# Patient Record
Sex: MALE | HISPANIC OR LATINO | Employment: FULL TIME | URBAN - METROPOLITAN AREA
[De-identification: names, ages, dates, MRNs, and addresses within clinical notes are randomized per-mention and may not be internally consistent; named-entity substitution may affect disease eponyms.]

---

## 2020-12-03 ENCOUNTER — OFFICE VISIT (OUTPATIENT)
Dept: CARDIOLOGY | Facility: MEDICAL CENTER | Age: 20
End: 2020-12-03
Payer: COMMERCIAL

## 2020-12-03 VITALS
HEIGHT: 71 IN | HEART RATE: 74 BPM | SYSTOLIC BLOOD PRESSURE: 122 MMHG | RESPIRATION RATE: 16 BRPM | DIASTOLIC BLOOD PRESSURE: 76 MMHG | OXYGEN SATURATION: 98 % | BODY MASS INDEX: 35.56 KG/M2 | WEIGHT: 254 LBS

## 2020-12-03 DIAGNOSIS — R07.9 CHEST PAIN, UNSPECIFIED TYPE: ICD-10-CM

## 2020-12-03 LAB — EKG IMPRESSION: NORMAL

## 2020-12-03 PROCEDURE — 99204 OFFICE O/P NEW MOD 45 MIN: CPT | Performed by: INTERNAL MEDICINE

## 2020-12-03 PROCEDURE — 93000 ELECTROCARDIOGRAM COMPLETE: CPT | Performed by: INTERNAL MEDICINE

## 2020-12-03 ASSESSMENT — ENCOUNTER SYMPTOMS
CLAUDICATION: 0
IRREGULAR HEARTBEAT: 0
SYNCOPE: 0
DECREASED APPETITE: 0
ABDOMINAL PAIN: 0
CONSTIPATION: 0
DEPRESSION: 0
COUGH: 0
NAUSEA: 0
BLURRED VISION: 0
ALTERED MENTAL STATUS: 0
NEAR-SYNCOPE: 0
FLANK PAIN: 0
HEARTBURN: 0
PALPITATIONS: 0
DIZZINESS: 0
DYSPNEA ON EXERTION: 0
VOMITING: 0
PND: 0
FEVER: 0
SHORTNESS OF BREATH: 0
BACK PAIN: 0
DIARRHEA: 0
WEIGHT LOSS: 0
ORTHOPNEA: 0
WEIGHT GAIN: 0

## 2020-12-03 NOTE — PROGRESS NOTES
Cardiology Note    Chief Complaint   Patient presents with   • Chest Pain       History of Present Illness: Oliver Lucas is a 20 y.o. male who presents for chest pain.     Describes left sided chest discomfort associated with back pain leading to neck pain and headaches. Aggravated with deep breathing and turning in bed to left side. Sometimes can reproduce pain, however, this is not consistent and he sometimes feels like pain is deeper in. Stays physically active at work, however, has been limited due to knee pain while playing soccer. Now can walk about upwards of two miles. Would have to stop due to knee of hip pain. Had echo performed where was told a part of his heart was swollen. He does drink alcohol about 4-5 daily and binges on weekends with family gatherings. Denies tobacco. When eats spicy notices heartburn. Claims this has not been the case lately.     Review of Systems   Constitution: Negative for decreased appetite, fever, malaise/fatigue, weight gain and weight loss.   HENT: Negative for congestion and nosebleeds.    Eyes: Negative for blurred vision.   Cardiovascular: Positive for chest pain. Negative for claudication, dyspnea on exertion, irregular heartbeat, leg swelling, near-syncope, orthopnea, palpitations, paroxysmal nocturnal dyspnea and syncope.   Respiratory: Negative for cough and shortness of breath.    Endocrine: Negative for cold intolerance and heat intolerance.   Skin: Negative for rash.   Musculoskeletal: Negative for back pain.   Gastrointestinal: Negative for abdominal pain, constipation, diarrhea, heartburn, melena, nausea and vomiting.   Genitourinary: Negative for dysuria, flank pain and hematuria.   Neurological: Negative for dizziness.   Psychiatric/Behavioral: Negative for altered mental status and depression.         Past Medical History:   Diagnosis Date   • Hypercholesteremia    • Hypertension          History reviewed. No pertinent surgical history.      Current  Outpatient Medications   Medication Sig Dispense Refill   • ibuprofen (MOTRIN) 200 MG Tab Take 400 mg by mouth every 6 hours as needed.     • cyclobenzaprine (FLEXERIL) 10 MG Tab Take 1 Tab by mouth 3 times a day as needed. (Patient not taking: Reported on 12/3/2020) 20 Tab 0   • ibuprofen (MOTRIN) 800 MG Tab Take 1 Tab by mouth every 8 hours as needed. (Patient not taking: Reported on 12/3/2020) 15 Tab 3   • acetaminophen (TYLENOL) 160 MG/5ML SUSP Take  by mouth every four hours as needed.       No current facility-administered medications for this visit.          No Known Allergies      History reviewed. No pertinent family history.      Social History     Socioeconomic History   • Marital status: Single     Spouse name: Not on file   • Number of children: Not on file   • Years of education: Not on file   • Highest education level: Not on file   Occupational History   • Not on file   Social Needs   • Financial resource strain: Not on file   • Food insecurity     Worry: Not on file     Inability: Not on file   • Transportation needs     Medical: Not on file     Non-medical: Not on file   Tobacco Use   • Smoking status: Never Smoker   • Smokeless tobacco: Never Used   Substance and Sexual Activity   • Alcohol use: Not on file   • Drug use: Not on file   • Sexual activity: Not on file   Lifestyle   • Physical activity     Days per week: Not on file     Minutes per session: Not on file   • Stress: Not on file   Relationships   • Social connections     Talks on phone: Not on file     Gets together: Not on file     Attends [employer] service: Not on file     Active member of club or organization: Not on file     Attends meetings of clubs or organizations: Not on file     Relationship status: Not on file   • Intimate partner violence     Fear of current or ex partner: Not on file     Emotionally abused: Not on file     Physically abused: Not on file     Forced sexual activity: Not on file   Other Topics Concern   • Not on  "file   Social History Narrative   • Not on file         Physical Exam:  Ambulatory Vitals  /76 (BP Location: Right arm, Patient Position: Sitting, BP Cuff Size: Adult)   Pulse 74   Resp 16   Ht 1.803 m (5' 11\")   Wt 115.2 kg (254 lb)   SpO2 98%    BP Readings from Last 4 Encounters:   12/03/20 122/76   09/29/15 125/58 (72 %, Z = 0.60 /  13 %, Z = -1.11)*   09/09/11 139/87 (>99 %, Z >2.33 /  >99 %, Z >2.33)*     *BP percentiles are based on the 2017 AAP Clinical Practice Guideline for boys     Weight/BMI:   Vitals:    12/03/20 1436   BP: 122/76   Weight: 115.2 kg (254 lb)   Height: 1.803 m (5' 11\")    Body mass index is 35.43 kg/m².  Wt Readings from Last 4 Encounters:   12/03/20 115.2 kg (254 lb)   09/29/15 104.8 kg (231 lb) (99 %, Z= 2.26)*   09/09/11 74 kg (163 lb 2.3 oz) (97 %, Z= 1.96)*     * Growth percentiles are based on CDC (Boys, 2-20 Years) data.       Physical Exam   Constitutional: He is oriented to person, place, and time and well-developed, well-nourished, and in no distress. No distress.   HENT:   Head: Normocephalic and atraumatic.   Eyes: Pupils are equal, round, and reactive to light. Conjunctivae are normal.   Neck: Normal range of motion. Neck supple. No JVD present.   Cardiovascular: Normal rate, regular rhythm, normal heart sounds and intact distal pulses. Exam reveals no gallop and no friction rub.   No murmur heard.  Pulmonary/Chest: Effort normal and breath sounds normal. No respiratory distress. He has no wheezes. He has no rales. He exhibits no tenderness.   Abdominal: Soft. Bowel sounds are normal. He exhibits no distension.   Musculoskeletal:         General: No edema.   Neurological: He is alert and oriented to person, place, and time.   Skin: Skin is warm and dry.   Psychiatric: Affect and judgment normal.       Lab Data Review:  Lab Results   Component Value Date/Time    CHOLSTRLTOT 192 (H) 09/09/2011 02:07 PM     09/09/2011 02:07 PM    HDL 34 (L) 09/09/2011 02:07 PM "    TRIGLYCERIDE 122 09/09/2011 02:07 PM       Lab Results   Component Value Date/Time    SODIUM 136 09/09/2011 02:07 PM    POTASSIUM 4.2 09/09/2011 02:07 PM    CHLORIDE 101 09/09/2011 02:07 PM    CO2 23 09/09/2011 02:07 PM    GLUCOSE 91 09/09/2011 02:07 PM    BUN 10 09/09/2011 02:07 PM    CREATININE 0.47 (L) 09/09/2011 02:07 PM     CrCl cannot be calculated (Patient's most recent lab result is older than the maximum 7 days allowed.).  Lab Results   Component Value Date/Time    ALKPHOSPHAT 254 09/09/2011 02:07 PM    ASTSGOT 87 (H) 09/09/2011 02:07 PM    ALTSGPT 107 (H) 09/09/2011 02:07 PM    TBILIRUBIN 0.4 09/09/2011 02:07 PM      Lab Results   Component Value Date/Time    WBC 7.4 09/09/2011 02:07 PM     No results found for: HBA1C  No components found for: TROP      Cardiac Imaging and Procedures Review:      EKG 12/3/20 sinus 74 with incomplete rbbb    Medical Decision Making:  Problem List Items Addressed This Visit     Chest pain    Relevant Orders    EKG (Completed)    EC-ECHOCARDIOGRAM COMPLETE W/O CONT    EC-ECHOCARDIOGRAM DOBUTAMINE REST/STRESS W/ CONT        Most likely chest wall pain and musculoskeletal complaints. He does persist sometimes changes character and feels deeper ache. Also had supposed abnormal outside echo. Will repeat echo study and add dobutamine stress; cannot exercise due to knee injury. If normal, no further testing. Tylenol for pain. Recommend rest and regular calisthenics with stretching when pain improves.     It was my pleasure to meet with Mr. Leeon Lance.

## 2020-12-03 NOTE — PATIENT INSTRUCTIONS
Gastroesophageal Reflux Disease, Adult  Gastroesophageal reflux (EDD) happens when acid from the stomach flows up into the tube that connects the mouth and the stomach (esophagus). Normally, food travels down the esophagus and stays in the stomach to be digested. With EDD, food and stomach acid sometimes move back up into the esophagus. You may have a disease called gastroesophageal reflux disease (GERD) if the reflux:  · Happens often.  · Causes frequent or very bad symptoms.  · Causes problems such as damage to the esophagus.  When this happens, the esophagus becomes sore and swollen (inflamed). Over time, GERD can make small holes (ulcers) in the lining of the esophagus.  What are the causes?  This condition is caused by a problem with the muscle between the esophagus and the stomach. When this muscle is weak or not normal, it does not close properly to keep food and acid from coming back up from the stomach. The muscle can be weak because of:  · Tobacco use.  · Pregnancy.  · Having a certain type of hernia (hiatal hernia).  · Alcohol use.  · Certain foods and drinks, such as coffee, chocolate, onions, and peppermint.  What increases the risk?  You are more likely to develop this condition if you:  · Are overweight.  · Have a disease that affects your connective tissue.  · Use NSAID medicines.  What are the signs or symptoms?  Symptoms of this condition include:  · Heartburn.  · Difficult or painful swallowing.  · The feeling of having a lump in the throat.  · A bitter taste in the mouth.  · Bad breath.  · Having a lot of saliva.  · Having an upset or bloated stomach.  · Belching.  · Chest pain. Different conditions can cause chest pain. Make sure you see your doctor if you have chest pain.  · Shortness of breath or noisy breathing (wheezing).  · Ongoing (chronic) cough or a cough at night.  · Wearing away of the surface of teeth (tooth enamel).  · Weight loss.  How is this treated?  Treatment will depend on how  bad your symptoms are. Your doctor may suggest:  · Changes to your diet.  · Medicine.  · Surgery.  Follow these instructions at home:  Eating and drinking    · Follow a diet as told by your doctor. You may need to avoid foods and drinks such as:  ? Coffee and tea (with or without caffeine).  ? Drinks that contain alcohol.  ? Energy drinks and sports drinks.  ? Bubbly (carbonated) drinks or sodas.  ? Chocolate and cocoa.  ? Peppermint and mint flavorings.  ? Garlic and onions.  ? Horseradish.  ? Spicy and acidic foods. These include peppers, chili powder, patterson powder, vinegar, hot sauces, and BBQ sauce.  ? Citrus fruit juices and citrus fruits, such as oranges, mary, and limes.  ? Tomato-based foods. These include red sauce, chili, salsa, and pizza with red sauce.  ? Fried and fatty foods. These include donuts, french fries, potato chips, and high-fat dressings.  ? High-fat meats. These include hot dogs, rib eye steak, sausage, ham, and kelley.  ? High-fat dairy items, such as whole milk, butter, and cream cheese.  · Eat small meals often. Avoid eating large meals.  · Avoid drinking large amounts of liquid with your meals.  · Avoid eating meals during the 2-3 hours before bedtime.  · Avoid lying down right after you eat.  · Do not exercise right after you eat.  Lifestyle    · Do not use any products that contain nicotine or tobacco. These include cigarettes, e-cigarettes, and chewing tobacco. If you need help quitting, ask your doctor.  · Try to lower your stress. If you need help doing this, ask your doctor.  · If you are overweight, lose an amount of weight that is healthy for you. Ask your doctor about a safe weight loss goal.  General instructions  · Pay attention to any changes in your symptoms.  · Take over-the-counter and prescription medicines only as told by your doctor. Do not take aspirin, ibuprofen, or other NSAIDs unless your doctor says it is okay.  · Wear loose clothes. Do not wear anything tight  around your waist.  · Raise (elevate) the head of your bed about 6 inches (15 cm).  · Avoid bending over if this makes your symptoms worse.  · Keep all follow-up visits as told by your doctor. This is important.  Contact a doctor if:  · You have new symptoms.  · You lose weight and you do not know why.  · You have trouble swallowing or it hurts to swallow.  · You have wheezing or a cough that keeps happening.  · Your symptoms do not get better with treatment.  · You have a hoarse voice.  Get help right away if:  · You have pain in your arms, neck, jaw, teeth, or back.  · You feel sweaty, dizzy, or light-headed.  · You have chest pain or shortness of breath.  · You throw up (vomit) and your throw-up looks like blood or coffee grounds.  · You pass out (faint).  · Your poop (stool) is bloody or black.  · You cannot swallow, drink, or eat.  Summary  · If a person has gastroesophageal reflux disease (GERD), food and stomach acid move back up into the esophagus and cause symptoms or problems such as damage to the esophagus.  · Treatment will depend on how bad your symptoms are.  · Follow a diet as told by your doctor.  · Take all medicines only as told by your doctor.  This information is not intended to replace advice given to you by your health care provider. Make sure you discuss any questions you have with your health care provider.  Document Released: 06/05/2009 Document Revised: 06/26/2019 Document Reviewed: 06/26/2019  [employer] Patient Education © 2020 [employer]      Chest Wall Pain  Chest wall pain is pain in or around the bones and muscles of your chest. Sometimes, an injury causes this pain. Excessive coughing or overuse of arm and chest muscles may also cause chest wall pain. Sometimes, the cause may not be known. This pain may take several weeks or longer to get better.  Follow these instructions at home:  Managing pain, stiffness, and swelling    · If directed, put ice on the painful area:  ? Put ice in a  plastic bag.  ? Place a towel between your skin and the bag.  ? Leave the ice on for 20 minutes, 2-3 times per day.  Activity  · Rest as told by your health care provider.  · Avoid activities that cause pain. These include any activities that use your chest muscles or your abdominal and side muscles to lift heavy items. Ask your health care provider what activities are safe for you.  General instructions    · Take over-the-counter and prescription medicines only as told by your health care provider.  · Do not use any products that contain nicotine or tobacco, such as cigarettes, e-cigarettes, and chewing tobacco. These can delay healing after injury. If you need help quitting, ask your health care provider.  · Keep all follow-up visits as told by your health care provider. This is important.  Contact a health care provider if:  · You have a fever.  · Your chest pain becomes worse.  · You have new symptoms.  Get help right away if:  · You have nausea or vomiting.  · You feel sweaty or light-headed.  · You have a cough with mucus from your lungs (sputum) or you cough up blood.  · You develop shortness of breath.  These symptoms may represent a serious problem that is an emergency. Do not wait to see if the symptoms will go away. Get medical help right away. Call your local emergency services (911 in the [place] Do not drive yourself to the hospital.  Summary  · Chest wall pain is pain in or around the bones and muscles of your chest.  · Depending on the cause, it may be treated with ice, rest, medicines, and avoiding activities that cause pain.  · Contact a health care provider if you have a fever, worsening chest pain, or new symptoms.  · Get help right away if you feel light-headed or you develop shortness of breath. These symptoms may be an emergency.  This information is not intended to replace advice given to you by your health care provider. Make sure you discuss any questions you have with your health care  provider.  Document Released: 12/18/2006 Document Revised: 06/20/2019 Document Reviewed: 06/20/2019  [employer] Patient Education © 2020 [employer]

## 2020-12-14 ENCOUNTER — APPOINTMENT (OUTPATIENT)
Dept: PHYSICAL THERAPY | Facility: REHABILITATION | Age: 20
End: 2020-12-14
Attending: STUDENT IN AN ORGANIZED HEALTH CARE EDUCATION/TRAINING PROGRAM
Payer: COMMERCIAL

## 2021-01-08 ENCOUNTER — HOSPITAL ENCOUNTER (OUTPATIENT)
Dept: CARDIOLOGY | Facility: MEDICAL CENTER | Age: 21
End: 2021-01-08
Attending: INTERNAL MEDICINE
Payer: COMMERCIAL

## 2021-01-08 DIAGNOSIS — R07.9 CHEST PAIN, UNSPECIFIED TYPE: ICD-10-CM

## 2021-01-08 PROCEDURE — 93306 TTE W/DOPPLER COMPLETE: CPT

## 2021-01-09 LAB
LV EJECT FRACT  99904: 60
LV EJECT FRACT MOD 2C 99903: 60.17
LV EJECT FRACT MOD 4C 99902: 58.58
LV EJECT FRACT MOD BP 99901: 57.84

## 2021-01-09 PROCEDURE — 93306 TTE W/DOPPLER COMPLETE: CPT | Mod: 26 | Performed by: INTERNAL MEDICINE

## 2021-01-13 ENCOUNTER — TELEPHONE (OUTPATIENT)
Dept: CARDIOLOGY | Facility: MEDICAL CENTER | Age: 21
End: 2021-01-13

## 2021-01-13 NOTE — TELEPHONE ENCOUNTER
EC-ECHOCARDIOGRAM COMPLETE W/O CONT  KOBE Andrews R.N.               Normal systolic function. Dilated right atrium and right ventricle. Outpatient assessment for sleep apnea with primary care physician. Not on mychart. Thank you!      Called pt and notified of echo results and encouraged to f/u with PCP for potential sleep apnea workup. Also reminded pt of the stress echo that was ordered. Provided with image schedulers number.

## 2022-08-25 PROBLEM — M79.642 LEFT HAND PAIN: Status: ACTIVE | Noted: 2022-08-25
